# Patient Record
Sex: MALE | Race: OTHER | HISPANIC OR LATINO | URBAN - METROPOLITAN AREA
[De-identification: names, ages, dates, MRNs, and addresses within clinical notes are randomized per-mention and may not be internally consistent; named-entity substitution may affect disease eponyms.]

---

## 2024-11-02 ENCOUNTER — EMERGENCY (EMERGENCY)
Facility: HOSPITAL | Age: 34
LOS: 1 days | Discharge: HOME | End: 2024-11-02
Attending: EMERGENCY MEDICINE | Admitting: EMERGENCY MEDICINE
Payer: OTHER MISCELLANEOUS

## 2024-11-02 VITALS
RESPIRATION RATE: 19 BRPM | DIASTOLIC BLOOD PRESSURE: 88 MMHG | HEART RATE: 90 BPM | SYSTOLIC BLOOD PRESSURE: 132 MMHG | TEMPERATURE: 98 F | OXYGEN SATURATION: 95 % | WEIGHT: 199.96 LBS | HEIGHT: 71 IN

## 2024-11-02 PROCEDURE — 99284 EMERGENCY DEPT VISIT MOD MDM: CPT | Mod: 25

## 2024-11-02 PROCEDURE — 12001 RPR S/N/AX/GEN/TRNK 2.5CM/<: CPT

## 2024-11-02 RX ORDER — CLOSTRIDIUM TETANI TOXOID ANTIGEN (FORMALDEHYDE INACTIVATED), CORYNEBACTERIUM DIPHTHERIAE TOXOID ANTIGEN (FORMALDEHYDE INACTIVATED), BORDETELLA PERTUSSIS TOXOID ANTIGEN (GLUTARALDEHYDE INACTIVATED), BORDETELLA PERTUSSIS FILAMENTOUS HEMAGGLUTININ ANTIGEN (FORMALDEHYDE INACTIVATED), BORDETELLA PERTUSSIS PERTACTIN ANTIGEN, AND BORDETELLA PERTUSSIS FIMBRIAE 2/3 ANTIGEN 5; 2; 2.5; 5; 3; 5 [LF]/.5ML; [LF]/.5ML; UG/.5ML; UG/.5ML; UG/.5ML; UG/.5ML
0.5 INJECTION, SUSPENSION INTRAMUSCULAR ONCE
Refills: 0 | Status: COMPLETED | OUTPATIENT
Start: 2024-11-02 | End: 2024-11-02

## 2024-11-02 RX ORDER — LIDOCAINE HCL 60 MG/3 ML
20 SYRINGE (ML) INJECTION ONCE
Refills: 0 | Status: COMPLETED | OUTPATIENT
Start: 2024-11-02 | End: 2024-11-02

## 2024-11-02 RX ADMIN — Medication 20 MILLILITER(S): at 15:02

## 2024-11-02 NOTE — ED PROVIDER NOTE - OBJECTIVE STATEMENT
The patient is a 34-year-old male who presents to the emergency room with complaints of a left thumb injury.    Patient works as a line  in a restaurant and cut the tip of his left thumb with a knife approximately 2 hours prior to interview in the emergency room.    Patient does not know his last tetanus immunization patient denies motor or sensory deficit finger..    Patient has no other complaints

## 2024-11-02 NOTE — ED PROVIDER NOTE - CLINICAL SUMMARY MEDICAL DECISION MAKING FREE TEXT BOX
ED evaluation and management discussed with the patient and family (if available) in detail.  Close PMD follow up encouraged.  Strict ED return instructions discussed in detail and patient given the opportunity to ask any questions about their discharge diagnosis and instructions. Patient verbalized understanding.    avulsion anesthetized and cleaned extensively and then nitrostix   no additional bleeding

## 2024-11-02 NOTE — ED PROVIDER NOTE - IN ACCORDANCE WITH [place] STATE LAW, WE OFFER EVERY PATIENT A HEPATITIS C TEST. WOULD YOU LIKE TO BE TESTED TODAY?
Pharmacist Admission Medication Reconciliation Pending Note    Prior to Admission Medications were reviewed by the pharmacist and pended for provider review during admission medication reconciliation.    Medications were pended by the pharmacist at this time as follows:    Pended Admission Order Reconciliation Actions     Order Name Action Reordered As    vitamin E 1000 UNITS capsule Do Not Order for Admission     Lancet Devices (AUTO-LANCET) MISC Do Not Order for Admission     Glucose Blood (BLOOD GLUCOSE TEST STRIPS) STRP Do Not Order for Admission     Blood Glucose Monitoring Suppl (BLOOD GLUCOSE METER) KIT Do Not Order for Admission     lisinopril (ZESTRIL) 20 MG tablet Order for Admission lisinopril (ZESTRIL) tablet 20 mg    potassium chloride (K-DUR,KLOR-CON) 20 MEQ CR tablet Order for Admission potassium chloride (KLOR-CON M) iraida ER tablet 20 mEq    Cholecalciferol (VITAMIN D3 PO) Do Not Order for Admission     Cyanocobalamin (VITAMIN B 12 PO) Do Not Order for Admission     Omega-3 Fatty Acids (FISH OIL) 1000 MG capsule Do Not Order for Admission             Orders Pended To Continue For Hospital Stay     ID Description Pended By When Reason    [id number] lisinopril (ZESTRIL) tablet 20 mg-DAILY Ninfa Mariscal RPH 06/09/18 1912     [id number] potassium chloride (KLOR-CON M) iraida ER tablet 20 mEq-2 TIMES DAILY Ninfa Mariscal RPH 06/09/18 1912             Pharmacist Notations:     1) Insulin glargine: Left unaddressed, low blood sugar on admission.  2) The following medications left unaddressed (reports not taking past week/month): aspirin, furosemide and tamsulosin.     Last edited by Ninfa Mariscal RPH on 06/09/18 at 1912          Orders that are ultimately reconciled and signed during admission medication reconciliation may differ from the pended actions above.    Please contact the pharmacist for questions.    Ninfa Mariscal RPH  6/9/2018 7:12 PM  
Opt out

## 2024-11-02 NOTE — ED ADULT NURSE NOTE - OBJECTIVE STATEMENT
Patient is a 35 y/o M c/o lacerations. patient reports laceration sustained to left thumb today after using knife to cut fruit. Finger intact  Bleeding controlled with gauze and pressure. tetanus utd.

## 2024-11-02 NOTE — ED PROVIDER NOTE - PATIENT PORTAL LINK FT
You can access the FollowMyHealth Patient Portal offered by [facility] by registering at the following website: [email or web address]. By joining [facility]’s FollowMyHealth portal, you will also be able to view your health information using other applications (apps) compatible with our system.

## 2024-11-02 NOTE — ED ADULT TRIAGE NOTE - CHIEF COMPLAINT QUOTE
Pt walk in c/o L thumb laceration sustained today while cutting fruit. Bleeding well controlled. Tdap UTD.

## 2024-11-02 NOTE — ED ADULT NURSE NOTE - NSFALLUNIVINTERV_ED_ALL_ED
Bed/Stretcher in lowest position, wheels locked, appropriate side rails in place/Call bell, personal items and telephone in reach/Instruct patient to call for assistance before getting out of bed/chair/stretcher/Non-slip footwear applied when patient is off stretcher/[place] to call system/Physically safe environment - no spills, clutter or unnecessary equipment/Purposeful proactive rounding/Room/bathroom lighting operational, light cord in reach
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
## 2024-11-06 DIAGNOSIS — S61.002A UNSPECIFIED OPEN WOUND OF LEFT THUMB WITHOUT DAMAGE TO NAIL, INITIAL ENCOUNTER: ICD-10-CM
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
